# Patient Record
(demographics unavailable — no encounter records)

---

## 2025-04-25 NOTE — HISTORY OF PRESENT ILLNESS
[de-identified] : 57 years old female has past medical history of prediabetes hyperlipidemia, and GERD, came back to office for annual physical exam.  Pt saw a gastroenterologist last year, s/p EGD unremarkable and had blood tests. Fasting glucose was 127 on that day. Home fasting glucose was around 130. Pt is willing to start metformin today.

## 2025-04-25 NOTE — HISTORY OF PRESENT ILLNESS
[de-identified] : 57 years old female has past medical history of prediabetes hyperlipidemia, and GERD, came back to office for annual physical exam.  Pt saw a gastroenterologist last year, s/p EGD unremarkable and had blood tests. Fasting glucose was 127 on that day. Home fasting glucose was around 130. Pt is willing to start metformin today.

## 2025-04-25 NOTE — HEALTH RISK ASSESSMENT
[Good] : ~his/her~  mood as  good [No] : In the past 12 months have you used drugs other than those required for medical reasons? No [0] : 2) Feeling down, depressed, or hopeless: Not at all (0) [Yes] : Reviewed medication list for presence of high-risk medications. [Opioids] : opioids [Never] : Never [Patient declined mammogram] : Patient declined mammogram [Patient declined PAP Smear] : Patient declined PAP Smear [Patient reported colonoscopy was normal] : Patient reported colonoscopy was normal [PHQ-2 Negative - No further assessment needed] : PHQ-2 Negative - No further assessment needed [[id number]] : 0 [ColonoscopyDate] : 2019

## 2025-04-25 NOTE — HEALTH RISK ASSESSMENT
[Good] : ~his/her~  mood as  good [No] : In the past 12 months have you used drugs other than those required for medical reasons? No [0] : 2) Feeling down, depressed, or hopeless: Not at all (0) [Yes] : Reviewed medication list for presence of high-risk medications. [Opioids] : opioids [Never] : Never [Patient declined mammogram] : Patient declined mammogram [Patient declined PAP Smear] : Patient declined PAP Smear [Patient reported colonoscopy was normal] : Patient reported colonoscopy was normal [PHQ-2 Negative - No further assessment needed] : PHQ-2 Negative - No further assessment needed [[id number]] : 0 [ColonoscopyDate] : 2019